# Patient Record
Sex: MALE | Race: WHITE | NOT HISPANIC OR LATINO | Employment: FULL TIME | URBAN - METROPOLITAN AREA
[De-identification: names, ages, dates, MRNs, and addresses within clinical notes are randomized per-mention and may not be internally consistent; named-entity substitution may affect disease eponyms.]

---

## 2019-08-19 ENCOUNTER — OFFICE VISIT (OUTPATIENT)
Dept: ORTHOPEDIC SURGERY | Facility: CLINIC | Age: 39
End: 2019-08-19

## 2019-08-19 VITALS — WEIGHT: 150 LBS | HEIGHT: 69 IN | RESPIRATION RATE: 16 BRPM | BODY MASS INDEX: 22.22 KG/M2

## 2019-08-19 DIAGNOSIS — M79.671 RIGHT FOOT PAIN: Primary | ICD-10-CM

## 2019-08-19 DIAGNOSIS — S92.011S CLOSED DISPLACED FRACTURE OF BODY OF RIGHT CALCANEUS, SEQUELA: ICD-10-CM

## 2019-08-19 PROBLEM — S92.011A CLOSED DISPLACED FRACTURE OF BODY OF RIGHT CALCANEUS: Status: ACTIVE | Noted: 2019-08-19

## 2019-08-19 PROCEDURE — 99205 OFFICE O/P NEW HI 60 MIN: CPT | Performed by: ORTHOPAEDIC SURGERY

## 2019-08-19 RX ORDER — DULOXETIN HYDROCHLORIDE 60 MG/1
60 CAPSULE, DELAYED RELEASE ORAL DAILY
COMMUNITY

## 2019-08-19 RX ORDER — LIDOCAINE 50 MG/G
1 PATCH TOPICAL EVERY 24 HOURS
COMMUNITY

## 2019-08-19 RX ORDER — DEXTROAMPHETAMINE SACCHARATE, AMPHETAMINE ASPARTATE, DEXTROAMPHETAMINE SULFATE AND AMPHETAMINE SULFATE 5; 5; 5; 5 MG/1; MG/1; MG/1; MG/1
20 TABLET ORAL DAILY
COMMUNITY

## 2019-08-19 RX ORDER — PREGABALIN 75 MG/1
75 CAPSULE ORAL 2 TIMES DAILY
COMMUNITY

## 2019-08-19 RX ORDER — ARIPIPRAZOLE 30 MG/1
30 TABLET ORAL DAILY
COMMUNITY

## 2019-08-19 NOTE — PROGRESS NOTES
NEW PATIENT    Patient: Jimi Truong  : 1980    Primary Care Provider: Scottie Mesa DO    Requesting Provider: As above    Pain of the Right Foot      History    Chief Complaint: Right foot pain    History of Present Illness: This is an extremely pleasant 38-year-old gentleman who is here today with his wife and  for a second opinion regarding a severe right foot problem.  He was working as a  and on 2018 had a severe crushing injury.  He sustained a comminuted right calcaneus fracture complicated by compartment syndrome.  He had release of the compartments, he then had open reduction internal fixation of the calcaneus with primary fusion.  I reviewed his outside records.  There are several 100 pages.  He had very slow wound healing.  He had a highly comminuted fracture based on the x-ray reports.  He also had a femur fracture.  The femur was treated with intramedullary yung.  He also had bilateral L1 transverse process fractures.  He had multiple rib fractures.  Prior to the injury he smoked a pack a day.  He was not able to smoke while he was in the hospital but he now does nicotine vapor.  I recommended that he quit.  I explained nicotine causes hardening of the arteries and prevents bone healing.  He is here for an opinion regarding the chronic pain and swelling that he has in the right calcaneus.    He has participated in physical therapy and is still going for the right foot.  He gradually was able to weight-bear, he now is able to walk with a cane.  He reports pain and swelling that gets worse throughout the day.  He is able to stand only for about 15 minutes.  He reports the pain is 7-10 out of 10, sharp and aching.  He notes persistent swelling and stiffness in the foot.  2 of his screws were subsequently removed, they were evidently posterior inferior to superior anterior screws crossing the subtalar joint.  He reports that did not change his symptoms.  He had  EMG nerve conduction studies done on 6/25/2019.  I was able to review these, he has abnormality in the sural and lateral plantar nerve on the right, as well as an S1 radiculopathy.  He currently is taking Lyrica for the symptoms.  He has tried Lidoderm.  Evidently a compounded cream has been ordered.  Steroid injection have been discussed.  Bracing has been discussed by his wife and .  He has tried a TENS unit.  He does have a support stocking that he wears most days.  He has some custom inserts.  He notes that he tends to walk with his leg pointed outward, he has been wondering if the foot is crooked.  It also has been noted that he has right calf atrophy.    Current Outpatient Medications on File Prior to Visit   Medication Sig Dispense Refill   • amphetamine-dextroamphetamine (ADDERALL) 20 MG tablet Take 20 mg by mouth Daily.     • ARIPiprazole (ABILIFY) 30 MG tablet Take 30 mg by mouth Daily.     • DULoxetine (CYMBALTA) 60 MG capsule Take 60 mg by mouth Daily.     • lidocaine (LIDODERM) 5 % Place 1 patch on the skin as directed by provider Daily. Remove & Discard patch within 12 hours or as directed by MD     • pregabalin (LYRICA) 75 MG capsule Take 75 mg by mouth 2 (Two) Times a Day.       No current facility-administered medications on file prior to visit.       Allergies   Allergen Reactions   • Codeine Itching and Swelling      Past Medical History:   Diagnosis Date   • Hypertension      Past Surgical History:   Procedure Laterality Date   • FEMUR SURGERY Left 10/2018    fracture with yung through left femur   • FOOT FRACTURE SURGERY Right 11/2018    10/2018, 11/2018, 2019 hardware placement and 2 screws removed this year     No family history on file.   Social History     Socioeconomic History   • Marital status:      Spouse name: Not on file   • Number of children: Not on file   • Years of education: Not on file   • Highest education level: Not on file   Tobacco Use   • Smoking status:  "Current Every Day Smoker   • Smokeless tobacco: Never Used   Substance and Sexual Activity   • Alcohol use: No     Frequency: Never   • Drug use: No   • Sexual activity: Defer        Review of Systems   Constitutional: Positive for activity change, diaphoresis and fatigue.   Eyes: Negative.    Respiratory: Positive for shortness of breath.    Cardiovascular: Positive for leg swelling.   Gastrointestinal: Negative.    Endocrine: Negative.    Genitourinary: Negative.    Musculoskeletal: Positive for arthralgias, back pain, gait problem and joint swelling.   Skin: Negative.    Allergic/Immunologic: Negative.    Neurological: Positive for numbness and headaches.   Hematological: Negative.    Psychiatric/Behavioral: Positive for agitation, decreased concentration and sleep disturbance. The patient is nervous/anxious and is hyperactive.        The following portions of the patient's history were reviewed and updated as appropriate: allergies, current medications, past family history, past medical history, past social history, past surgical history and problem list.    Physical Exam:   Resp 16   Ht 175.3 cm (69\")   Wt 68 kg (150 lb)   BMI 22.15 kg/m²   GENERAL: Body habitus: normal weight for height    Lower extremity edema: Right: 1+ pitting; Left: trace    Varicose veins:  Right: none; Left: none    Gait: antalgic and using cane     Mental Status:  awake and alert; oriented to person, place, and time    Voice:  clear  SKIN:  Lower extremity: warm and dry    Hair Growth(lower extremity):  Right:diminished; Left:  normal  NAILS: Toenails: thick  HEENT: Head: Normocephalic, atraumatic,  without obvious abnormality.  eye: normal external eye, no icterus  ears:normal external ears  nose: normal external nose  PULM:  Repiratory effort normal  CV:  Dorsalis Pedis:  Right: 2+; Left:2+    Posterior Tibial: Right:2+; Left:2+    Capillary Refill:  Brisk  MSK:  Hand:sensation intact      Tibia:  Right:  non tender; Left:  non " tender      Ankle:  Right: Not tender over the ankle joint itself, no ankle effusion, moderately stiff, he has about 8 degrees of dorsiflexion, 20 degrees plantarflexion, no inversion eversion; Left:  non tender, ROM  normal and motor function  normal      Foot:  Right:  Extremely tender to palpation around the calcaneus and subtalar joint region.  Hypersensitive over the lateral scar.  The scar is healed but very thin and adherent to bone.  This is static in the sural and posterior tibial nerve distributions.  No specific Tinel's that I can identify.  Decreased sensation to light touch in the [place] Sandra fiber on the plantar surface of the right foot.  Healed L-shaped incision over the calcaneus, healed dorsal incisions over the midfoot.  Midfoot and toes are also quite stiff, with about 20 degree arc of motion.  Moderate thickening throughout the foot and ankle, nothing that looks specifically like RSD moderate dependent rubor which is normal motor function difficult to evaluate due to pain and stiffness.  Toe motors fire, anterior tibial tendon fires, gastroc fires, with the knee flexed and the ankle in neutral, the second toe lines up with the center of the patella, there is no abnormal alignment, he walks with the leg externally rotated at the hip.  Left:  non tender, ROM  normal and motor function  normal      NEURO: Heel Walking:  Right:  Unable to perform this; Left:  able to get forefoot off ground, difficulty with balance    Toe Walking:  Right:  limited ability, painful; Left:  able to get up on toes, difficulty with balance     [place]-Sandra 5.07 monofilament test: Patchy decrease plantar surface right foot, intact left    Lower extremity sensation: Decreased plantar surface right foot, intact left     Reflexes:  Biceps:  Right:  2+; Left:  2+           Quads:  Right:  2+; Left:  2+           Ankle:  Right:  0; Left:  1+      Calf Atrophy:Moderate right calf atrophy as expected    Motor Function:  Difficult to evaluate all the motors on the right leg, the anterior tib fires, the gastroc fires, the toe extensors and flexors fire.  No function of the posterior tib nor peroneals which is to be expected.  Left leg motors are 5 out of 5.         Medical Decision Making    Data Review:   ordered and reviewed x-rays today, reviewed prior lab results, reviewed radiology images, reviewed radiology results and reviewed outside records I reviewed the 100s of pages of records including his hospitalization, rehab, surgeries, labs, radiologic studies etc.    Assessment and Plan/ Diagnosis/Treatment options:   1. Closed displaced fracture of body of right calcaneus, sequela  I had a long discussion with the patient, his wife and  regarding calcaneus fractures.  I use the model to show them.  I explained that calcaneus fractures never return the foot to normal.  I explained that when I see someone with a calcaneus fracture I tell them at the beginning they will always have some stiffness, some swelling, and pain.  Everyone will have this to a greater or lesser degree.  His injury was especially severe.  He had a compartment syndrome.  He clearly had nerve damage from the injury.  In looking at the radiographs today, the heel is in neutral alignment.  We did a hindfoot alignment film, and the calcaneus is centered under the tibia.  This is the correct position.  In looking at the axial and lateral films, I can still faintly see the subtalar joint line.  The calcaneus plate is intact, it looks like there is some Norion type bone graft in the center of the calcaneus.  (In reading the original op report for the fracture fixation and fusion, the bone graft material is a blank line, the  must not have understood or the word, therefore I am guessing that it is Norion from the radiograph appearance )  his ankle joint is preserved as are the midfoot joints.  I explained to the patient that he is  "progressing as one would expect from such a very difficult injury.  He probably has as much range of motion as he will ever obtained.  He asked if he should continue therapy.  If therapy helps his symptoms it is fine to continue, but if it does not help his symptoms, I do not think he needs to pursue therapy.  His range of motion has probably reached its maximal plateau.  I explained that in general we assume that calcaneus fractures will reach a symptom plateau at about 18 months after injury.  He still has some time to see if there will be some improvement in the pain.  However I expect he will always have fairly significant pain given the combination of problems.  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  Vocational rehabilitation has been discussed.  His  asked if he would always need Lyrica, I think that is probably true.  They asked about steroid injection, I do not see anything that an injection would help.  I would not recommend injecting the soft tissues of the heel, and assuming the subtalar joint is fused, that joint is not available for injection.  I do think adding a brace would help his symptoms.  I would also recommend he continue to wear the support stocking every day.  A PTB brace can help offload the foot.  I went ahead and gave him a prescription for this, [facility] made his orthotics, and I gave him the prescription for a PTB brace.  I explained that I leave it to the patient and the prosthetic test to decide whether they want an all poly-one that goes in the shoe, or one that has double upright hinges and attaches to a shoe.    We discussed the subtalar fusion, I explained that these can take 12 months or more to be solid.  I do still faintly see the joint line, I would suggest using a CT scan with metal suppression software to evaluate the fusion.  Often if you see  what we call \"spot welds\" at this point in time, they will go on " to progress to consolidation.  If the CT scan shows a nonunion, I am not entirely certain that redoing it would help his pain enough.  His pain is very global, its very neurogenic, and even if he had a nonunion of the subtalar joint, I am not certain revision surgery would change his symptoms much.  But, I do think it is reasonable to get a CT scan to look at this.    I also explained that his foot is in neutral alignment.  I showed him how it sits in neutral when the knee is flexed, and I showed him the hindfoot alignment film.  The reason he walks with a turned outward, is its what the brain does to make the gait more efficient.  When the foot and ankle are stiff and painful, our brain tells the leg to turn out at the hip joint, to be able to have a little bit more speedy and efficient gait.  He also had questions about the calf atrophy.  I explained this is permanent.  I explained that muscles only get big when they can move the joints involved.  Because his foot and ankle are now stiff from the injury, he will always have calf atrophy.    He was here today for a second opinion only.  He would like to be followed close to home which is very reasonable.  I will be happy to see him anytime.  Questions asked and answered in extensive detail.

## 2019-10-30 ENCOUNTER — TELEPHONE (OUTPATIENT)
Dept: ORTHOPEDIC SURGERY | Facility: CLINIC | Age: 39
End: 2019-10-30

## 2019-10-30 NOTE — TELEPHONE ENCOUNTER
I am sorry, but I only agreed to see the patient as a second opinion.  If they feel he needs someone to treat him here in [place], I would recommend .

## 2019-10-30 NOTE — TELEPHONE ENCOUNTER
ARI MEHTA CALLED REGARDING SECOND OPINION. SHE SAID THE PATIENT IS NOT IMPROVING WITH THE BRACE AND WANTED TO KNOW IF HE COULD SEE DR PATEL AGAIN. I OFFERED AN APPOINTMENT FOR 11/1/19 BUT SHE DID NOT SCHEDULE THE PATIENT BECAUSE SHE BELIEVES HE MAY HAVE A LIGAMENT STRAIN. SHE ALSO SAID THAT SHE WOULD LIKE DR PATEL TO CALL THE [facility] MEDICAL DIRECTOR, DR LIBRADO SAUCEDA,  TO DISCUSS THE PATIENTS STATUS A WEEK PRIOR TO THE APPOINTMENT. HE CAN BE REACHED [phone]. ARI CAN BE REACHED [phone].

## 2019-10-30 NOTE — TELEPHONE ENCOUNTER
CONTACTED ARI MEHTA AND EXPLAINED DR PATEL RECOMMENDED [facility] FOR FOLLOW UP. SHE UNDERSTOOD AND WILL SEEK OTHER TREATMENT FOR THE PATIENT.